# Patient Record
Sex: FEMALE | Race: WHITE | Employment: FULL TIME | URBAN - METROPOLITAN AREA
[De-identification: names, ages, dates, MRNs, and addresses within clinical notes are randomized per-mention and may not be internally consistent; named-entity substitution may affect disease eponyms.]

---

## 2017-08-29 ENCOUNTER — LAB ENCOUNTER (OUTPATIENT)
Dept: LAB | Facility: REFERENCE LAB | Age: 27
End: 2017-08-29
Attending: OBSTETRICS & GYNECOLOGY
Payer: COMMERCIAL

## 2017-08-29 ENCOUNTER — OFFICE VISIT (OUTPATIENT)
Dept: OBGYN CLINIC | Facility: CLINIC | Age: 27
End: 2017-08-29

## 2017-08-29 VITALS — DIASTOLIC BLOOD PRESSURE: 64 MMHG | SYSTOLIC BLOOD PRESSURE: 110 MMHG | WEIGHT: 167 LBS

## 2017-08-29 DIAGNOSIS — Z32.00 ENCOUNTER FOR CONFIRMATION OF PREGNANCY TEST RESULT WITH PHYSICAL EXAMINATION: ICD-10-CM

## 2017-08-29 DIAGNOSIS — Z32.00 ENCOUNTER FOR CONFIRMATION OF PREGNANCY TEST RESULT WITH PHYSICAL EXAMINATION: Primary | ICD-10-CM

## 2017-08-29 LAB
BASOPHILS # BLD: 0 K/UL (ref 0–0.2)
BASOPHILS NFR BLD: 0 %
BILIRUB UR QL: NEGATIVE
COLOR UR: YELLOW
CONTROL LINE PRESENT WITH A CLEAR BACKGROUND (YES/NO): YES YES/NO
EOSINOPHIL # BLD: 0 K/UL (ref 0–0.7)
EOSINOPHIL NFR BLD: 0 %
ERYTHROCYTE [DISTWIDTH] IN BLOOD BY AUTOMATED COUNT: 12.4 % (ref 11–15)
GLUCOSE UR-MCNC: NEGATIVE MG/DL
HCT VFR BLD AUTO: 41.8 % (ref 35–48)
HGB BLD-MCNC: 14 G/DL (ref 12–16)
LYMPHOCYTES # BLD: 1.9 K/UL (ref 1–4)
LYMPHOCYTES NFR BLD: 17 %
MCH RBC QN AUTO: 30.4 PG (ref 27–32)
MCHC RBC AUTO-ENTMCNC: 33.3 G/DL (ref 32–37)
MCV RBC AUTO: 91.1 FL (ref 80–100)
MONOCYTES # BLD: 0.5 K/UL (ref 0–1)
MONOCYTES NFR BLD: 4 %
MULTISTIX EXPIRATION DATE: NORMAL DATE
MULTISTIX LOT#: NORMAL NUMERIC
NEUTROPHILS # BLD AUTO: 8.4 K/UL (ref 1.8–7.7)
NEUTROPHILS NFR BLD: 78 %
NITRITE UR QL STRIP.AUTO: NEGATIVE
PH UR: 7 [PH] (ref 5–8)
PH, URINE: 7 (ref 4.5–8)
PLATELET # BLD AUTO: 221 K/UL (ref 140–400)
PMV BLD AUTO: 9.6 FL (ref 7.4–10.3)
PREGNANCY TEST, URINE: POSITIVE
PROT UR-MCNC: 100 MG/DL
PROTEIN (URINE DIPSTICK): 300 MG/DL
RBC # BLD AUTO: 4.59 M/UL (ref 3.7–5.4)
RBC #/AREA URNS AUTO: 6 /HPF
RH BLOOD TYPE: POSITIVE
RUBV IGG SER-ACNC: 48.4 IU/ML
SP GR UR STRIP: 1.01 (ref 1–1.03)
SPECIFIC GRAVITY: 1.01 (ref 1–1.03)
URINE-COLOR: YELLOW
UROBILINOGEN UR STRIP-ACNC: <2
VIT C UR-MCNC: NEGATIVE MG/DL
WBC # BLD AUTO: 10.8 K/UL (ref 4–11)
WBC #/AREA URNS AUTO: 2 /HPF

## 2017-08-29 PROCEDURE — 85025 COMPLETE CBC W/AUTO DIFF WBC: CPT | Performed by: OBSTETRICS & GYNECOLOGY

## 2017-08-29 PROCEDURE — 36415 COLL VENOUS BLD VENIPUNCTURE: CPT | Performed by: OBSTETRICS & GYNECOLOGY

## 2017-08-29 PROCEDURE — 81002 URINALYSIS NONAUTO W/O SCOPE: CPT | Performed by: OBSTETRICS & GYNECOLOGY

## 2017-08-29 PROCEDURE — 81025 URINE PREGNANCY TEST: CPT | Performed by: OBSTETRICS & GYNECOLOGY

## 2017-08-29 PROCEDURE — 87340 HEPATITIS B SURFACE AG IA: CPT | Performed by: OBSTETRICS & GYNECOLOGY

## 2017-08-29 PROCEDURE — 86762 RUBELLA ANTIBODY: CPT | Performed by: OBSTETRICS & GYNECOLOGY

## 2017-08-29 PROCEDURE — 87389 HIV-1 AG W/HIV-1&-2 AB AG IA: CPT | Performed by: OBSTETRICS & GYNECOLOGY

## 2017-08-29 PROCEDURE — 86780 TREPONEMA PALLIDUM: CPT | Performed by: OBSTETRICS & GYNECOLOGY

## 2017-08-29 PROCEDURE — 99203 OFFICE O/P NEW LOW 30 MIN: CPT | Performed by: OBSTETRICS & GYNECOLOGY

## 2017-08-29 PROCEDURE — 81001 URINALYSIS AUTO W/SCOPE: CPT | Performed by: OBSTETRICS & GYNECOLOGY

## 2017-08-29 PROCEDURE — 87086 URINE CULTURE/COLONY COUNT: CPT | Performed by: OBSTETRICS & GYNECOLOGY
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
## 2017-08-30 LAB
HBV SURFACE AG SERPL QL IA: NONREACTIVE
HIV1+2 AB SERPL QL IA: NONREACTIVE
T PALLIDUM AB SER QL: NEGATIVE

## 2017-09-11 ENCOUNTER — TELEPHONE (OUTPATIENT)
Dept: OBGYN CLINIC | Facility: CLINIC | Age: 27
End: 2017-09-11

## 2017-09-11 PROBLEM — Q87.81: Status: ACTIVE | Noted: 2017-09-11

## 2017-09-11 PROBLEM — Z14.8 GENETIC CARRIER: Status: ACTIVE | Noted: 2017-09-11

## 2017-09-12 NOTE — TELEPHONE ENCOUNTER
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 I dw her that she has a 50% chance of her child having the disease with each pregnancy.  I dw her that 40% of

## 2017-09-26 ENCOUNTER — OFFICE VISIT (OUTPATIENT)
Dept: OBGYN CLINIC | Facility: CLINIC | Age: 27
End: 2017-09-26

## 2017-09-26 VITALS
DIASTOLIC BLOOD PRESSURE: 80 MMHG | SYSTOLIC BLOOD PRESSURE: 120 MMHG | WEIGHT: 168 LBS | BODY MASS INDEX: 27.99 KG/M2 | HEIGHT: 65 IN

## 2017-09-26 DIAGNOSIS — Z34.01 ENCOUNTER FOR SUPERVISION OF NORMAL FIRST PREGNANCY IN FIRST TRIMESTER: Primary | ICD-10-CM

## 2017-09-26 DIAGNOSIS — Q87.81 ALPORT SYNDROME: ICD-10-CM

## 2017-09-26 PROBLEM — R31.29 MICROSCOPIC HEMATURIA: Status: ACTIVE | Noted: 2017-09-26

## 2017-09-26 NOTE — PROGRESS NOTES
Feels a lot less nauseous. Energy level is better. She has not spoken with  or nephrologist. Plan to check comprehensive metabolic profile and 24 hour urine for protein. Recommend sees nephrologist ASAP.  Materna 22 today

## 2017-10-14 ENCOUNTER — LAB ENCOUNTER (OUTPATIENT)
Dept: LAB | Facility: REFERENCE LAB | Age: 27
End: 2017-10-14
Attending: OBSTETRICS & GYNECOLOGY
Payer: COMMERCIAL

## 2017-10-14 DIAGNOSIS — Q87.81 ALPORT'S SYNDROME: Primary | ICD-10-CM

## 2017-10-22 ENCOUNTER — LAB ENCOUNTER (OUTPATIENT)
Dept: LAB | Facility: HOSPITAL | Age: 27
End: 2017-10-22
Attending: OBSTETRICS & GYNECOLOGY
Payer: COMMERCIAL

## 2017-10-22 DIAGNOSIS — Q87.81 ALPORT SYNDROME: ICD-10-CM

## 2017-10-22 DIAGNOSIS — Q87.81 ALPORT'S SYNDROME: Primary | ICD-10-CM

## 2017-10-22 PROCEDURE — 82575 CREATININE CLEARANCE TEST: CPT

## 2017-10-22 PROCEDURE — 80053 COMPREHEN METABOLIC PANEL: CPT

## 2017-10-22 PROCEDURE — 36415 COLL VENOUS BLD VENIPUNCTURE: CPT

## 2017-10-22 PROCEDURE — 84156 ASSAY OF PROTEIN URINE: CPT

## 2017-10-22 PROCEDURE — 82565 ASSAY OF CREATININE: CPT

## 2017-10-22 PROCEDURE — 82570 ASSAY OF URINE CREATININE: CPT

## 2017-10-24 ENCOUNTER — OFFICE VISIT (OUTPATIENT)
Dept: OBGYN CLINIC | Facility: CLINIC | Age: 27
End: 2017-10-24

## 2017-10-24 ENCOUNTER — TELEPHONE (OUTPATIENT)
Dept: OBGYN CLINIC | Facility: CLINIC | Age: 27
End: 2017-10-24

## 2017-10-24 VITALS
DIASTOLIC BLOOD PRESSURE: 80 MMHG | SYSTOLIC BLOOD PRESSURE: 116 MMHG | HEIGHT: 65 IN | BODY MASS INDEX: 28.16 KG/M2 | WEIGHT: 169 LBS

## 2017-10-24 DIAGNOSIS — Z36.9 ENCOUNTER FOR ANTENATAL SCREENING: ICD-10-CM

## 2017-10-24 DIAGNOSIS — O26.831: Primary | ICD-10-CM

## 2017-10-24 PROBLEM — R80.1 PERSISTENT PROTEINURIA: Status: ACTIVE | Noted: 2017-10-24

## 2017-10-24 NOTE — PROGRESS NOTES
Nausea and vomiting is getting better. Saw nephrologist @ [address] [place] [address], and plans FU in 1 week. She was unable to be seen by any of the [facility] MD's. Refer to Dr. Stephy Pink or Epifanio Curm for consultation.  I dw pt the option of delivering in tertiary care site with Ml Bowman

## 2017-11-09 ENCOUNTER — TELEPHONE (OUTPATIENT)
Dept: OBGYN CLINIC | Facility: CLINIC | Age: 27
End: 2017-11-09

## 2017-11-09 NOTE — TELEPHONE ENCOUNTER
Pt confused on when or where she needs to have usn.  Pt also lost all the info on what specialist she is suppose to see

## 2017-11-09 NOTE — TELEPHONE ENCOUNTER
Pt would like information about specialist recommended by Dr Angelica Hernandez and where to have ultrasound. Told ,per Dr. Daniel Conway notes that Dr. Héctor Monson and Trudy Peralta were recommended and ultrasound to be scheduled with MFM. Phone number given for MFM.

## 2017-11-15 ENCOUNTER — OFFICE VISIT (OUTPATIENT)
Dept: OBGYN CLINIC | Facility: CLINIC | Age: 27
End: 2017-11-15

## 2017-11-15 VITALS
BODY MASS INDEX: 29.36 KG/M2 | DIASTOLIC BLOOD PRESSURE: 60 MMHG | WEIGHT: 176.19 LBS | SYSTOLIC BLOOD PRESSURE: 120 MMHG | HEIGHT: 65 IN

## 2017-11-15 DIAGNOSIS — Q87.81 X-LINKED ALPORT SYNDROME IN HETEROZYGOUS FEMALE: Primary | ICD-10-CM

## 2017-11-16 NOTE — PROGRESS NOTES
No longer wants to see Dr. Byron Montenegro and wishes to discuss PN care. Reviewed diagnosis and risks associated to patient and fetus, and agreed on transfer to tertiary care center for remainder of care. Referral and phone given to MFM clinic at [facility].  Will obt

## 2017-11-22 ENCOUNTER — TELEPHONE (OUTPATIENT)
Dept: OBGYN CLINIC | Facility: CLINIC | Age: 27
End: 2017-11-22

## 2017-11-22 NOTE — TELEPHONE ENCOUNTER
[facility] [employer] for [facility]. Recorded faxed to [phone].

## 2017-11-30 ENCOUNTER — ULTRASOUND ENCOUNTER (OUTPATIENT)
Dept: OBGYN CLINIC | Facility: CLINIC | Age: 27
End: 2017-11-30

## 2017-11-30 DIAGNOSIS — Z36.9 ENCOUNTER FOR ANTENATAL SCREENING OF MOTHER: ICD-10-CM

## 2017-11-30 PROCEDURE — 76805 OB US >/= 14 WKS SNGL FETUS: CPT | Performed by: OBSTETRICS & GYNECOLOGY

## 2017-12-01 ENCOUNTER — TELEPHONE (OUTPATIENT)
Dept: OBGYN CLINIC | Facility: CLINIC | Age: 27
End: 2017-12-01

## 2017-12-01 NOTE — TELEPHONE ENCOUNTER
Patient calling to make sure her records have been sent to [facility]. Patient has appointment on Monday. I reassured patient that records were faxed on 11-22-17. I also told her that I will fax her recent ultrasound from 11-30-17.

## 2017-12-07 ENCOUNTER — TELEPHONE (OUTPATIENT)
Dept: OBGYN CLINIC | Facility: CLINIC | Age: 27
End: 2017-12-07

## 2017-12-07 NOTE — TELEPHONE ENCOUNTER
LM on VM about 20w anatomic survey all normal with isolated echogenic focus in LV that MFM can review once [facility] records are transferred. To call if any questions.

## 2021-09-23 ENCOUNTER — ANESTHESIA EVENT (OUTPATIENT)
Dept: OBGYN UNIT | Facility: HOSPITAL | Age: 31
End: 2021-09-23
Payer: COMMERCIAL

## 2021-09-23 ENCOUNTER — ANESTHESIA (OUTPATIENT)
Dept: OBGYN UNIT | Facility: HOSPITAL | Age: 31
End: 2021-09-23
Payer: COMMERCIAL

## 2021-09-23 ENCOUNTER — HOSPITAL ENCOUNTER (INPATIENT)
Facility: HOSPITAL | Age: 31
LOS: 2 days | Discharge: HOME | End: 2021-09-25
Attending: OBSTETRICS & GYNECOLOGY | Admitting: OBSTETRICS & GYNECOLOGY
Payer: COMMERCIAL

## 2021-09-23 PROBLEM — Z34.90 PREGNANCY: Status: ACTIVE | Noted: 2021-09-23

## 2021-09-23 PROBLEM — Z32.00 ENCOUNTER FOR CONFIRMATION OF PREGNANCY TEST RESULT WITH PHYSICAL EXAMINATION: Status: RESOLVED | Noted: 2017-08-29 | Resolved: 2021-09-23

## 2021-09-23 PROBLEM — O26.831: Status: RESOLVED | Noted: 2017-10-24 | Resolved: 2021-09-23

## 2021-09-23 PROCEDURE — 59409 OBSTETRICAL CARE: CPT | Performed by: OBSTETRICS & GYNECOLOGY

## 2021-09-23 PROCEDURE — 0KQM0ZZ REPAIR PERINEUM MUSCLE, OPEN APPROACH: ICD-10-PCS | Performed by: OBSTETRICS & GYNECOLOGY

## 2021-09-23 RX ORDER — ACETAMINOPHEN 500 MG
500 TABLET ORAL EVERY 6 HOURS PRN
Status: DISCONTINUED | OUTPATIENT
Start: 2021-09-23 | End: 2021-09-23 | Stop reason: HOSPADM

## 2021-09-23 RX ORDER — ONDANSETRON 2 MG/ML
4 INJECTION INTRAMUSCULAR; INTRAVENOUS EVERY 6 HOURS PRN
Status: DISCONTINUED | OUTPATIENT
Start: 2021-09-23 | End: 2021-09-25

## 2021-09-23 RX ORDER — IBUPROFEN 600 MG/1
600 TABLET ORAL EVERY 6 HOURS PRN
Status: DISCONTINUED | OUTPATIENT
Start: 2021-09-23 | End: 2021-09-23 | Stop reason: HOSPADM

## 2021-09-23 RX ORDER — BUPIVACAINE HYDROCHLORIDE 2.5 MG/ML
20 INJECTION, SOLUTION EPIDURAL; INFILTRATION; INTRACAUDAL ONCE
Status: DISCONTINUED | OUTPATIENT
Start: 2021-09-23 | End: 2021-09-23

## 2021-09-23 RX ORDER — NALBUPHINE HCL 10 MG/ML
2.5 AMPUL (ML) INJECTION
Status: DISCONTINUED | OUTPATIENT
Start: 2021-09-23 | End: 2021-09-25

## 2021-09-23 RX ORDER — ACETAMINOPHEN 325 MG/1
650 TABLET ORAL EVERY 6 HOURS PRN
Status: DISCONTINUED | OUTPATIENT
Start: 2021-09-23 | End: 2021-09-25

## 2021-09-23 RX ORDER — BUPIVACAINE HYDROCHLORIDE 2.5 MG/ML
20 INJECTION, SOLUTION EPIDURAL; INFILTRATION; INTRACAUDAL ONCE
Status: DISCONTINUED | OUTPATIENT
Start: 2021-09-23 | End: 2021-09-23 | Stop reason: HOSPADM

## 2021-09-23 RX ORDER — POTASSIUM CHLORIDE 20 MEQ/1
20 TABLET, EXTENDED RELEASE ORAL ONCE
Status: COMPLETED | OUTPATIENT
Start: 2021-09-23 | End: 2021-09-23

## 2021-09-23 RX ORDER — BUPIVACAINE HYDROCHLORIDE 2.5 MG/ML
INJECTION, SOLUTION EPIDURAL; INFILTRATION; INTRACAUDAL
Status: COMPLETED | OUTPATIENT
Start: 2021-09-23 | End: 2021-09-23

## 2021-09-23 RX ORDER — LIDOCAINE HYDROCHLORIDE 10 MG/ML
30 INJECTION, SOLUTION EPIDURAL; INFILTRATION; INTRACAUDAL; PERINEURAL ONCE
Status: DISCONTINUED | OUTPATIENT
Start: 2021-09-23 | End: 2021-09-23 | Stop reason: HOSPADM

## 2021-09-23 RX ORDER — ONDANSETRON 2 MG/ML
4 INJECTION INTRAMUSCULAR; INTRAVENOUS EVERY 6 HOURS PRN
Status: DISCONTINUED | OUTPATIENT
Start: 2021-09-23 | End: 2021-09-23 | Stop reason: HOSPADM

## 2021-09-23 RX ORDER — LIDOCAINE HYDROCHLORIDE AND EPINEPHRINE 15; 5 MG/ML; UG/ML
INJECTION, SOLUTION EPIDURAL
Status: COMPLETED | OUTPATIENT
Start: 2021-09-23 | End: 2021-09-23

## 2021-09-23 RX ORDER — DIAPER,BRIEF,INFANT-TODD,DISP
1 EACH MISCELLANEOUS EVERY 6 HOURS PRN
Status: DISCONTINUED | OUTPATIENT
Start: 2021-09-23 | End: 2021-09-25

## 2021-09-23 RX ORDER — TERBUTALINE SULFATE 1 MG/ML
0.25 INJECTION, SOLUTION SUBCUTANEOUS AS NEEDED
Status: DISCONTINUED | OUTPATIENT
Start: 2021-09-23 | End: 2021-09-23 | Stop reason: HOSPADM

## 2021-09-23 RX ORDER — IBUPROFEN 600 MG/1
600 TABLET ORAL EVERY 6 HOURS
Status: DISCONTINUED | OUTPATIENT
Start: 2021-09-23 | End: 2021-09-25

## 2021-09-23 RX ORDER — DOCUSATE SODIUM 100 MG/1
100 CAPSULE, LIQUID FILLED ORAL
Status: DISCONTINUED | OUTPATIENT
Start: 2021-09-23 | End: 2021-09-25

## 2021-09-23 RX ORDER — BUPIVACAINE HCL/0.9 % NACL/PF 0.25 %
5 PLASTIC BAG, INJECTION (ML) EPIDURAL AS NEEDED
Status: DISCONTINUED | OUTPATIENT
Start: 2021-09-23 | End: 2021-09-25

## 2021-09-23 RX ORDER — TRISODIUM CITRATE DIHYDRATE AND CITRIC ACID MONOHYDRATE 500; 334 MG/5ML; MG/5ML
30 SOLUTION ORAL AS NEEDED
Status: DISCONTINUED | OUTPATIENT
Start: 2021-09-23 | End: 2021-09-23 | Stop reason: HOSPADM

## 2021-09-23 RX ORDER — LIDOCAINE HYDROCHLORIDE 10 MG/ML
INJECTION, SOLUTION INFILTRATION; PERINEURAL
Status: COMPLETED | OUTPATIENT
Start: 2021-09-23 | End: 2021-09-23

## 2021-09-23 RX ORDER — AMMONIA INHALANTS 0.04 G/.3ML
0.3 INHALANT RESPIRATORY (INHALATION) AS NEEDED
Status: DISCONTINUED | OUTPATIENT
Start: 2021-09-23 | End: 2021-09-23 | Stop reason: HOSPADM

## 2021-09-23 RX ORDER — AMMONIA INHALANTS 0.04 G/.3ML
0.3 INHALANT RESPIRATORY (INHALATION) AS NEEDED
Status: DISCONTINUED | OUTPATIENT
Start: 2021-09-23 | End: 2021-09-25

## 2021-09-23 RX ORDER — BISACODYL 10 MG
10 SUPPOSITORY, RECTAL RECTAL ONCE AS NEEDED
Status: DISCONTINUED | OUTPATIENT
Start: 2021-09-23 | End: 2021-09-25

## 2021-09-23 RX ORDER — SIMETHICONE 80 MG
80 TABLET,CHEWABLE ORAL 3 TIMES DAILY PRN
Status: DISCONTINUED | OUTPATIENT
Start: 2021-09-23 | End: 2021-09-25

## 2021-09-23 RX ADMIN — LIDOCAINE HYDROCHLORIDE AND EPINEPHRINE 5 ML: 15; 5 INJECTION, SOLUTION EPIDURAL at 18:35:00

## 2021-09-23 RX ADMIN — LIDOCAINE HYDROCHLORIDE 5 ML: 10 INJECTION, SOLUTION INFILTRATION; PERINEURAL at 18:35:00

## 2021-09-23 RX ADMIN — BUPIVACAINE HYDROCHLORIDE 5 ML: 2.5 INJECTION, SOLUTION EPIDURAL; INFILTRATION; INTRACAUDAL at 18:35:00
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
## 2021-09-23 NOTE — ANESTHESIA PROCEDURE NOTES
Labor Analgesia    Date/Time: 9/23/2021 6:35 PM  Performed by: Pj Hopper MD  Authorized by: Pj Hopper MD       General Information and Staff    Start Time:  9/23/2021 4:35 PM  End Time:  9/23/2021 6:45 PM  Anesthesiologist:  Pj Hopper MD

## 2021-09-23 NOTE — H&P
[facility] Patient Status:  Observation    10/7/1990 MRN [id number]   Location [address] [place] [address] Attending Dean Phan MD   Hosp Day # 0 [facility] [address] [facility] Social History: Social History    Tobacco Use      Smoking status: Never Smoker      Smokeless tobacco: Never Used    Alcohol use: No      Home Meds: Prenatal Vit-Fe Sulfate-FA (PRENATAL VITAMIN OR), Take by mouth., Disp: , Rfl: , 9/23/2021 at Unknown

## 2021-09-24 NOTE — DISCHARGE SUMMARY
[facility]    Discharge Summary    [facility] Patient Status:  Observation    10/7/1990 MRN [id number]   Location [address] Attending Donis Abel MD   [facility] Day # 0       Delivering OB Clinician

## 2021-09-24 NOTE — LACTATION NOTE
This note was copied from a baby's chart.   LACTATION NOTE - INFANT    Evaluation Type  Evaluation Type: Inpatient    Problems & Assessment  Problems Diagnosed or Identified: Sleepy  Infant Assessment: Hunger cues present; Skin color: pink or appropriate fo

## 2021-09-24 NOTE — LACTATION NOTE
LACTATION NOTE - MOTHER      Evaluation Type: Inpatient    Problems identified  Problems identified: Knowledge deficit    Maternal history  Other/comment: Alports syndrome    Breastfeeding goal  Breastfeeding goal: To maintain breast milk feeding per patie

## 2021-09-24 NOTE — PROGRESS NOTES
Pt is a 35year old female admitted to [place] [address]. Patient presents with:  Contractions     Pt is  36w4d intra-uterine pregnancy. History obtained, consents signed. Oriented to room, staff, and plan of care.

## 2021-09-24 NOTE — L&D DELIVERY NOTE
[facility]    Vaginal Delivery Note    Summer Valdes Patient Status:  Observation    10/7/1990 MRN [id number]   Location [facility] Attending Ebonie Garcia MD   Hosp Day # 0 [facility]

## 2021-09-24 NOTE — PROGRESS NOTES
Post-Partum Note   9/24/2021, 9:48 AM    Subjective:  Patient doing well. No complaints. Tolerating regular diet. Ambulating without lightheadedness. Lochia light. No fevers, chills, HA, vision changes.      Objective:   09/23/21  2330 09/24/21  0300 09/24/

## 2021-09-24 NOTE — ANESTHESIA POSTPROCEDURE EVALUATION
Patient: Stacy Gorman    Procedure Summary     Date: 09/23/21 Room / Location:     Anesthesia Start: [address] [facility] [address] Anesthesia Stop: 1900    Procedure: LABOR ANALGESIA Diagnosis:     Scheduled Providers:  Anesthesiologist: Lima Lemus MD    Anesthesia Type

## 2021-09-24 NOTE — PROGRESS NOTES
Patient up to bathroom with assist x 2. Voided 300cc at this time. Patient transferred to mother/baby room 349 per wheelchair in stable condition with baby and personal belongings. Accompanied by significant other and staff.   Report given to mother/baby

## 2021-09-24 NOTE — PLAN OF CARE
Problem: PAIN - ADULT  Goal: Verbalizes/displays adequate comfort level or patient's stated pain goal  Description: INTERVENTIONS:  - Encourage pt to monitor pain and request assistance  - Assess pain using appropriate pain scale  - Administer analgesics Progressing  Goal: Optimize infant feeding at the breast  Description: INTERVENTIONS:  - Initiate breast feeding within first hour after birth. - Monitor effectiveness of current breast feeding efforts.   - Assess support systems available to mother/famil interactions. - Assess caregiver's emotional status and coping mechanisms. - Encourage caregiver to participate in  daily care. - Assess support systems available to mother/family.  - Provide /case management support as needed.   Puneet Courts

## 2021-09-24 NOTE — PLAN OF CARE
Problem: BIRTH - VAGINAL/ SECTION  Goal: Fetal and maternal status remain reassuring during the birth process  Description: INTERVENTIONS:  - Monitor vital signs  - Monitor fetal heart rate  - Monitor uterine activity  - Monitor labor progression perspectives and choices  Outcome: Progressing     Problem: Patient/Family Goals  Goal: Patient/Family Long Term Goal  Description: Patient's Long Term Goal: Uncomplicated delivery    Interventions:  - Induction/augmentation per protocol  - education  - in

## 2021-09-25 VITALS
TEMPERATURE: 99 F | DIASTOLIC BLOOD PRESSURE: 68 MMHG | WEIGHT: 192 LBS | HEART RATE: 81 BPM | SYSTOLIC BLOOD PRESSURE: 126 MMHG | OXYGEN SATURATION: 99 % | HEIGHT: 66 IN | BODY MASS INDEX: 30.86 KG/M2 | RESPIRATION RATE: 16 BRPM

## 2021-09-25 PROCEDURE — 99238 HOSP IP/OBS DSCHRG MGMT 30/<: CPT | Performed by: OBSTETRICS & GYNECOLOGY

## 2021-09-25 NOTE — PLAN OF CARE
Problem: ANXIETY  Goal: Will report anxiety at manageable levels  Description: INTERVENTIONS:  - Administer medication as ordered  - Teach and rehearse alternative coping skills  - Provide emotional support with 1:1 interaction with staff  Outcome: Progr assess for signs and symptoms of infection and hematoma. - Assess bladder function and monitor for bladder distention.  - Provide/instruct/assist with pericare as needed. - Provide VTE prophylaxis as needed.   - Monitor bowel function.  - Encourage ambula skin-to-skin contact. - Provide LC support as needed. - Assess for and manage engorgement. - Provide breast feeding education handouts and information on community breast feeding support.    Outcome: Progressing  Goal: Establishment of adequate milk supp

## 2021-10-13 ENCOUNTER — TELEPHONE (OUTPATIENT)
Dept: OBGYN UNIT | Facility: HOSPITAL | Age: 31
End: 2021-10-13

## (undated) NOTE — LETTER
[facility]  Administration of Anesthesia  1. I, Carin Dubin, or _________________________________ acting on her behalf, (Patient) (Dependent/Representative) request to receive anesthesia for my pending procedure/operation/treatment. bleeding, seizure, cardiac arrest and death. 7. AWARENESS: I understand that it is possible (but unlikely) to have explicit memory of events from the operating room while under general anesthesia.   8. ELECTROCONVULSIVE THERAPY PATIENTS: This consent serve below affirms that prior to the time of the procedure, I have explained to the patient and/or his/her guardian, the risks and benefits of undergoing anesthesia, as well as any reasonable alternatives.     ___________________________________________________

## (undated) NOTE — LETTER
Dear new mom:    Sorry, we missed you! The nurses of [facility] have tried to reach you by phone to ask if you have any questions regarding your health or the health and care of your new little one.     We hope you are doing moms and their babies (up to 7 months of age). Relatives and friends are welcome to attend with the new mom. Includes breastfeeding support with an IBCLC (lactation consultant) who is available to answer your breastfeeding questions.     Mom & Baby Hour (El go to [email or web address] or call [phone].  Fitness at [address]: [phone]  • [place]: [phone]    Facebook Groups  Healthy [employer]riven Moms—Celebrating motherhood.  For expec